# Patient Record
Sex: FEMALE | Race: WHITE | NOT HISPANIC OR LATINO | Employment: OTHER | URBAN - METROPOLITAN AREA
[De-identification: names, ages, dates, MRNs, and addresses within clinical notes are randomized per-mention and may not be internally consistent; named-entity substitution may affect disease eponyms.]

---

## 2020-04-23 ENCOUNTER — NEW PATIENT EMERGENCY (OUTPATIENT)
Dept: URBAN - METROPOLITAN AREA CLINIC 35 | Facility: CLINIC | Age: 40
End: 2020-04-23

## 2020-04-23 DIAGNOSIS — T15.11XA: ICD-10-CM

## 2020-04-23 PROCEDURE — 99203 OFFICE O/P NEW LOW 30 MIN: CPT

## 2020-04-23 ASSESSMENT — VISUAL ACUITY
OD_SC: 20/40-2
OS_SC: 20/20-1

## 2020-04-23 ASSESSMENT — TONOMETRY
OD_IOP_MMHG: 16
OS_IOP_MMHG: 14

## 2023-07-14 ENCOUNTER — ESTABLISHED PATIENT (OUTPATIENT)
Dept: URBAN - METROPOLITAN AREA CLINIC 35 | Facility: CLINIC | Age: 43
End: 2023-07-14

## 2023-07-14 DIAGNOSIS — L98.8: ICD-10-CM

## 2023-07-14 PROCEDURE — 96999VOL VOLUMA 1 CC

## 2023-07-14 PROCEDURE — 64612C BOTOX / COSMETIC

## 2023-08-11 ENCOUNTER — ESTABLISHED PATIENT (OUTPATIENT)
Dept: URBAN - METROPOLITAN AREA CLINIC 35 | Facility: CLINIC | Age: 43
End: 2023-08-11

## 2023-08-11 DIAGNOSIS — L98.8: ICD-10-CM

## 2023-08-11 PROCEDURE — 99499NC CONSULT, COSMETIC NO CHARGE
